# Patient Record
Sex: MALE | Race: WHITE | Employment: OTHER | URBAN - METROPOLITAN AREA
[De-identification: names, ages, dates, MRNs, and addresses within clinical notes are randomized per-mention and may not be internally consistent; named-entity substitution may affect disease eponyms.]

---

## 2022-06-11 ENCOUNTER — APPOINTMENT (OUTPATIENT)
Dept: CT IMAGING | Age: 82
End: 2022-06-11
Attending: EMERGENCY MEDICINE
Payer: MEDICARE

## 2022-06-11 ENCOUNTER — HOSPITAL ENCOUNTER (OUTPATIENT)
Age: 82
Setting detail: OBSERVATION
Discharge: HOME | End: 2022-06-12
Attending: EMERGENCY MEDICINE | Admitting: FAMILY MEDICINE
Payer: MEDICARE

## 2022-06-11 ENCOUNTER — APPOINTMENT (OUTPATIENT)
Dept: MRI IMAGING | Age: 82
End: 2022-06-11
Attending: FAMILY MEDICINE
Payer: MEDICARE

## 2022-06-11 DIAGNOSIS — G45.9 TIA (TRANSIENT ISCHEMIC ATTACK): Primary | ICD-10-CM

## 2022-06-11 LAB
ALBUMIN SERPL-MCNC: 3.6 G/DL (ref 3.5–5)
ALBUMIN/GLOB SERPL: 1.3 {RATIO} (ref 1.1–2.2)
ALP SERPL-CCNC: 85 U/L (ref 45–117)
ALT SERPL-CCNC: 39 U/L (ref 12–78)
ANION GAP SERPL CALC-SCNC: 1 MMOL/L (ref 5–15)
APPEARANCE UR: CLEAR
AST SERPL-CCNC: 28 U/L (ref 15–37)
BACTERIA URNS QL MICRO: NEGATIVE /HPF
BASOPHILS # BLD: 0 K/UL (ref 0–0.1)
BASOPHILS NFR BLD: 0 % (ref 0–1)
BILIRUB SERPL-MCNC: 0.7 MG/DL (ref 0.2–1)
BILIRUB UR QL: NEGATIVE
BUN SERPL-MCNC: 21 MG/DL (ref 6–20)
BUN/CREAT SERPL: 27 (ref 12–20)
CALCIUM SERPL-MCNC: 9 MG/DL (ref 8.5–10.1)
CHLORIDE SERPL-SCNC: 103 MMOL/L (ref 97–108)
CK SERPL-CCNC: 104 U/L (ref 39–308)
CK SERPL-CCNC: 87 U/L (ref 39–308)
CO2 SERPL-SCNC: 33 MMOL/L (ref 21–32)
COLOR UR: ABNORMAL
COVID-19 RAPID TEST, COVR: NOT DETECTED
CREAT SERPL-MCNC: 0.79 MG/DL (ref 0.7–1.3)
DIFFERENTIAL METHOD BLD: NORMAL
EOSINOPHIL # BLD: 0.2 K/UL (ref 0–0.4)
EOSINOPHIL NFR BLD: 3 % (ref 0–7)
EPITH CASTS URNS QL MICRO: ABNORMAL /LPF
ERYTHROCYTE [DISTWIDTH] IN BLOOD BY AUTOMATED COUNT: 12.7 % (ref 11.5–14.5)
GLOBULIN SER CALC-MCNC: 2.7 G/DL (ref 2–4)
GLUCOSE SERPL-MCNC: 96 MG/DL (ref 65–100)
GLUCOSE UR STRIP.AUTO-MCNC: NEGATIVE MG/DL
HCT VFR BLD AUTO: 39.7 % (ref 36.6–50.3)
HGB BLD-MCNC: 13.2 G/DL (ref 12.1–17)
HGB UR QL STRIP: ABNORMAL
HYALINE CASTS URNS QL MICRO: ABNORMAL /LPF (ref 0–5)
IMM GRANULOCYTES # BLD AUTO: 0 K/UL (ref 0–0.04)
IMM GRANULOCYTES NFR BLD AUTO: 0 % (ref 0–0.5)
INR PPP: 1 (ref 0.9–1.1)
KETONES UR QL STRIP.AUTO: NEGATIVE MG/DL
LEUKOCYTE ESTERASE UR QL STRIP.AUTO: NEGATIVE
LYMPHOCYTES # BLD: 1.6 K/UL (ref 0.8–3.5)
LYMPHOCYTES NFR BLD: 24 % (ref 12–49)
MCH RBC QN AUTO: 32 PG (ref 26–34)
MCHC RBC AUTO-ENTMCNC: 33.2 G/DL (ref 30–36.5)
MCV RBC AUTO: 96.4 FL (ref 80–99)
MONOCYTES # BLD: 0.4 K/UL (ref 0–1)
MONOCYTES NFR BLD: 7 % (ref 5–13)
NEUTS SEG # BLD: 4.3 K/UL (ref 1.8–8)
NEUTS SEG NFR BLD: 66 % (ref 32–75)
NITRITE UR QL STRIP.AUTO: NEGATIVE
NRBC # BLD: 0 K/UL (ref 0–0.01)
NRBC BLD-RTO: 0 PER 100 WBC
PH UR STRIP: 7.5 [PH] (ref 5–8)
PLATELET # BLD AUTO: 165 K/UL (ref 150–400)
PMV BLD AUTO: 10.3 FL (ref 8.9–12.9)
POTASSIUM SERPL-SCNC: 4.3 MMOL/L (ref 3.5–5.1)
PROT SERPL-MCNC: 6.3 G/DL (ref 6.4–8.2)
PROT UR STRIP-MCNC: NEGATIVE MG/DL
PROTHROMBIN TIME: 10.9 SEC (ref 9–11.1)
RBC # BLD AUTO: 4.12 M/UL (ref 4.1–5.7)
RBC #/AREA URNS HPF: ABNORMAL /HPF (ref 0–5)
SODIUM SERPL-SCNC: 137 MMOL/L (ref 136–145)
SOURCE, COVRS: NORMAL
SP GR UR REFRACTOMETRY: 1.01 (ref 1–1.03)
TROPONIN-HIGH SENSITIVITY: 10 NG/L (ref 0–76)
TROPONIN-HIGH SENSITIVITY: 11 NG/L (ref 0–76)
UR CULT HOLD, URHOLD: NORMAL
UROBILINOGEN UR QL STRIP.AUTO: 1 EU/DL (ref 0.2–1)
WBC # BLD AUTO: 6.5 K/UL (ref 4.1–11.1)
WBC URNS QL MICRO: ABNORMAL /HPF (ref 0–4)

## 2022-06-11 PROCEDURE — 99285 EMERGENCY DEPT VISIT HI MDM: CPT

## 2022-06-11 PROCEDURE — 80053 COMPREHEN METABOLIC PANEL: CPT

## 2022-06-11 PROCEDURE — 84484 ASSAY OF TROPONIN QUANT: CPT

## 2022-06-11 PROCEDURE — 36415 COLL VENOUS BLD VENIPUNCTURE: CPT

## 2022-06-11 PROCEDURE — 93005 ELECTROCARDIOGRAM TRACING: CPT

## 2022-06-11 PROCEDURE — 85610 PROTHROMBIN TIME: CPT

## 2022-06-11 PROCEDURE — 0042T CT CODE NEURO PERF W CBF: CPT

## 2022-06-11 PROCEDURE — 85025 COMPLETE CBC W/AUTO DIFF WBC: CPT

## 2022-06-11 PROCEDURE — G0378 HOSPITAL OBSERVATION PER HR: HCPCS

## 2022-06-11 PROCEDURE — 82550 ASSAY OF CK (CPK): CPT

## 2022-06-11 PROCEDURE — 70551 MRI BRAIN STEM W/O DYE: CPT

## 2022-06-11 PROCEDURE — 87635 SARS-COV-2 COVID-19 AMP PRB: CPT

## 2022-06-11 PROCEDURE — 74011250637 HC RX REV CODE- 250/637: Performed by: FAMILY MEDICINE

## 2022-06-11 PROCEDURE — 70496 CT ANGIOGRAPHY HEAD: CPT

## 2022-06-11 PROCEDURE — 70450 CT HEAD/BRAIN W/O DYE: CPT

## 2022-06-11 PROCEDURE — 74011000636 HC RX REV CODE- 636: Performed by: RADIOLOGY

## 2022-06-11 PROCEDURE — 81001 URINALYSIS AUTO W/SCOPE: CPT

## 2022-06-11 PROCEDURE — 74011250636 HC RX REV CODE- 250/636: Performed by: FAMILY MEDICINE

## 2022-06-11 RX ORDER — SOTALOL HYDROCHLORIDE 80 MG/1
40 TABLET ORAL EVERY EVENING
COMMUNITY

## 2022-06-11 RX ORDER — ASPIRIN 81 MG/1
81 TABLET ORAL DAILY
COMMUNITY
End: 2022-06-12

## 2022-06-11 RX ORDER — ACETAMINOPHEN 325 MG/1
650 TABLET ORAL
Status: DISCONTINUED | OUTPATIENT
Start: 2022-06-11 | End: 2022-06-12 | Stop reason: HOSPADM

## 2022-06-11 RX ORDER — ACETAMINOPHEN 650 MG/1
650 SUPPOSITORY RECTAL
Status: DISCONTINUED | OUTPATIENT
Start: 2022-06-11 | End: 2022-06-12 | Stop reason: HOSPADM

## 2022-06-11 RX ORDER — ATORVASTATIN CALCIUM 40 MG/1
80 TABLET, FILM COATED ORAL
Status: DISCONTINUED | OUTPATIENT
Start: 2022-06-11 | End: 2022-06-12 | Stop reason: HOSPADM

## 2022-06-11 RX ORDER — SODIUM CHLORIDE 9 MG/ML
75 INJECTION, SOLUTION INTRAVENOUS CONTINUOUS
Status: DISCONTINUED | OUTPATIENT
Start: 2022-06-11 | End: 2022-06-12 | Stop reason: HOSPADM

## 2022-06-11 RX ORDER — GUAIFENESIN 100 MG/5ML
81 LIQUID (ML) ORAL DAILY
Status: DISCONTINUED | OUTPATIENT
Start: 2022-06-12 | End: 2022-06-12

## 2022-06-11 RX ORDER — SOTALOL HYDROCHLORIDE 80 MG/1
80 TABLET ORAL EVERY MORNING
COMMUNITY
Start: 2021-12-13

## 2022-06-11 RX ORDER — FINASTERIDE 5 MG/1
5 TABLET, FILM COATED ORAL EVERY EVENING
COMMUNITY

## 2022-06-11 RX ORDER — ATORVASTATIN CALCIUM 20 MG/1
20 TABLET, FILM COATED ORAL EVERY EVENING
COMMUNITY
Start: 2021-07-08

## 2022-06-11 RX ORDER — FAMOTIDINE 20 MG/1
20 TABLET, FILM COATED ORAL EVERY 12 HOURS
Status: DISCONTINUED | OUTPATIENT
Start: 2022-06-11 | End: 2022-06-12 | Stop reason: HOSPADM

## 2022-06-11 RX ADMIN — IOPAMIDOL 20 ML: 755 INJECTION, SOLUTION INTRAVENOUS at 13:56

## 2022-06-11 RX ADMIN — SODIUM CHLORIDE 75 ML/HR: 9 INJECTION, SOLUTION INTRAVENOUS at 20:51

## 2022-06-11 RX ADMIN — ATORVASTATIN CALCIUM 80 MG: 20 TABLET, FILM COATED ORAL at 20:52

## 2022-06-11 RX ADMIN — IOPAMIDOL 100 ML: 755 INJECTION, SOLUTION INTRAVENOUS at 13:57

## 2022-06-11 NOTE — ED NOTES
Patient LKW 12 PM. Patient C/O slurred speech and AMS for about an hour, which has now resolved. Pt A&O x4, hx of A fib. BG per ems 163, HR 50s, and /90.

## 2022-06-11 NOTE — ED PROVIDER NOTES
81-year-old male with history of atrial fibrillation, on Eliquis, presents to the emergency department by EMS after he was last known well at about noon cleaning his car when he developed confusion and slurred speech that was witnessed by his family. The symptoms reportedly lasted for about an hour and EMS called. Upon their arrival they found him to be improving and on arrival to the emergency department he denies any further symptoms. His blood pressure was significantly elevated on scene at 201/90. Blood glucose was 163, heart rate in the 50s other was awake alert and oriented x4. No history of prior CVAs. No head trauma. No fever, chills, chest pain, shortness of breath, dysuria or any other medical concerns. He says that he has been compliant with his medications. Past Medical History:   Diagnosis Date    Atrial fibrillation ([facility] [address]        No past surgical history on file. No family history on file. Social History     Socioeconomic History    Marital status:      Spouse name: Not on file    Number of children: Not on file    Years of education: Not on file    Highest education level: Not on file   Occupational History    Not on file   Tobacco Use    Smoking status: Not on file    Smokeless tobacco: Not on file   Substance and Sexual Activity    Alcohol use: Not on file    Drug use: Not on file    Sexual activity: Not on file   Other Topics Concern    Not on file   Social History Narrative    Not on file     Social Determinants of Health     Financial Resource Strain:     Difficulty of Paying Living Expenses: Not on file   Food Insecurity:     Worried About Running Out of Food in the Last Year: Not on file    Dane of Food in the Last Year: Not on file   Transportation Needs:     Lack of Transportation (Medical): Not on file    Lack of Transportation (Non-Medical):  Not on file   Physical Activity:     Days of Exercise per Week: Not on file    Minutes of Exercise per Session: Not on file   Stress:     Feeling of Stress : Not on file   Social Connections:     Frequency of Communication with Friends and Family: Not on file    Frequency of Social Gatherings with Friends and Family: Not on file    Attends Voodoo Services: Not on file    Active Member of Clubs or Organizations: Not on file    Attends Club or Organization Meetings: Not on file    Marital Status: Not on file   Intimate Partner Violence:     Fear of Current or Ex-Partner: Not on file    Emotionally Abused: Not on file    Physically Abused: Not on file    Sexually Abused: Not on file   Housing Stability:     Unable to Pay for Housing in the Last Year: Not on file    Number of [place] in the Last Year: Not on file    Unstable Housing in the Last Year: Not on file         ALLERGIES: Patient has no allergy information on record. Review of Systems   Constitutional: Positive for activity change. Negative for appetite change, chills and fever. HENT: Negative for congestion, rhinorrhea, sinus pain, sneezing and sore throat. Eyes: Negative for photophobia and visual disturbance. Respiratory: Negative for cough and shortness of breath. Cardiovascular: Negative for chest pain. Gastrointestinal: Negative for abdominal pain, blood in stool, constipation, diarrhea, nausea and vomiting. Genitourinary: Negative for difficulty urinating, dysuria, flank pain, hematuria, penile pain and testicular pain. Musculoskeletal: Negative for arthralgias, back pain, myalgias and neck pain. Skin: Negative for rash and wound. Neurological: Positive for speech difficulty. Negative for dizziness, syncope, facial asymmetry, weakness, light-headedness, numbness and headaches. Psychiatric/Behavioral: Positive for confusion. Negative for self-injury and suicidal ideas. All other systems reviewed and are negative.       Vitals:    06/11/22 1501 06/11/22 1506 06/11/22 1530 06/11/22 1531   BP: (!) 171/104  (!) 175/104    Pulse: (!) 58 (!) 57 62    Resp: 17 26  15   SpO2: 96% 95% 96%    Weight:                Physical Exam  Vitals and nursing note reviewed. Constitutional:       General: He is not in acute distress. Appearance: Normal appearance. He is well-developed. He is not diaphoretic. Comments: Pleasant gentleman, no acute distress. HENT:      Head: Normocephalic and atraumatic. Nose: Nose normal.   Eyes:      Extraocular Movements: Extraocular movements intact. Conjunctiva/sclera: Conjunctivae normal.      Pupils: Pupils are equal, round, and reactive to light. Cardiovascular:      Rate and Rhythm: Normal rate and regular rhythm. Heart sounds: Normal heart sounds. Pulmonary:      Effort: Pulmonary effort is normal.      Breath sounds: Normal breath sounds. Abdominal:      General: There is no distension. Palpations: Abdomen is soft. Tenderness: There is no abdominal tenderness. Musculoskeletal:         General: No tenderness. Cervical back: Normal range of motion and neck supple. Skin:     General: Skin is warm and dry. Neurological:      General: No focal deficit present. Mental Status: He is alert and oriented to person, place, and time. Cranial Nerves: No cranial nerve deficit. Sensory: No sensory deficit. Motor: No weakness. Coordination: Coordination normal.      Comments: Intact sensation, 5/5 strength in all 4 extremities, intact finger to nose, neg pronator drift, fluent speech, CN intact. MDM   81-year-old male presents with suspected TIA. Now neuro intact. CT, CTA/CTP ordered to further evaluate. Case was discussed with teleneurology who saw the patient at the bedside. They recommend admission for TIA work-up. Labs returned showing no significant abnormalities. CT scans show no acute abnormalities.     Will admit to the hospitalist service for TIA work-up  Procedures  1425 EKG shows sinus bradycardia with a rate of 58 bpm with no acute ST or T wave abnormalities suggestive of ischemia. Perfect Serve Consult for Admission  2:49 PM    ED Room Number: ER06/06  Patient Name and age:  Gracy Dumont 81 y.o.  male  Working Diagnosis:   1. TIA (transient ischemic attack)        COVID-19 Suspicion:  no  Sepsis present:  no  Reassessment needed: no  Code Status:  Full Code  Readmission: no  Isolation Requirements:  no  Recommended Level of Care:  telemetry  Department:[facility] Adult ED - 21   Other: 81-year-old male presented with acute confusion and slurred speech for approximately 1 hour. Last seen normal around noon today. Symptoms now resolved. CTs negative. Hx of a. fib on Eliquis. Seen by teleneuro recommended admit for TIA work-up. No hx of prior CVA.

## 2022-06-11 NOTE — ADVANCED PRACTICE NURSE
Neurocritical Care Code Stroke Documentation      Symptoms:   slurred speech x 1 hour with AMS, resolved on arrival, high BP in pt with afib on Eliquis, took this morning. He also is having urinary frequency with having to urinate 3x within 27 mine   Last Known Well: 12 noon today    Medical hx: Past Medical History:   Diagnosis Date    Atrial fibrillation (HCC)       Anticoagulation:  Eliquis, took this morning   VAN:   Negative   NIHSS:   1a-LOC:0    1b-Month/Age:0    1c-Open/Close Hand:0    2-Best Gaze:0    3-Visual Fields:0    4-Facial Palsy:0    5a-Left Arm:0    5b-Right Arm:0    6a-Left Le    6b-Right Le    7-Limb Ataxia:0    8-Sensory:0    9-Best Language:0    10-Dysarthria:0    11-Extinction/Inattention:0  TOTAL SCORE:0   Imaging:   CT: no acute abnormality    CTA: No LVO    CTP: No perfusion mismatch   Plan:   TPA Candidate: NO--NIIH 0, resolved S/S    Mechanical thrombectomy Candidate: NO--no LVO     *Perform dysphagia screening prior to any PO intake*    Discussed with: Dr. Tory Hutchinson, Dr. Laxmi Espino, ED RN, patient     Arrival time: in ED when pt arrived  Time spent: 40 minutes.      Ladi Baca NP  Neurocritical Care Nurse Practitioner  [phone]

## 2022-06-11 NOTE — H&P
[facility] Adult  Hospitalist Group  History and Physical    Date of Service:  6/11/2022  Primary Care Provider: Varun, MD Thomas  Source of information: The patient and Chart review    Chief Complaint: No chief complaint on file. History of Presenting Illness:   Rosa Arias is a 81 y.o. male who presents with slurred speech    History was probably obtained from the patient, patient reports that today he had his granddaughter's graduation and was talking to family members when he became confused and had slurred speech, the symptoms lasted for about an hour, he got concerned and decided to come to the hospital, patient reports currently his symptom-free, patient reports that he had no weakness in his limbs, patient on arrival to the ED had a blood pressure of 201/ 90, patient was requested to be admitted to the hospitalist service    The patient denies any headache, blurry vision, sore throat, trouble swallowing, trouble with speech, chest pain, SOB, cough, fever, chills, N/V/D, abd pain, urinary symptoms, constipation, recent travels, sick contacts, falls, injuries, rashes, contact with COVID-19 diagnosed patients, hematemesis, melena, hemoptysis, hematuria, rashes, denies starting any new medications and denies any other concerns or problems besides as mentioned above. REVIEW OF SYSTEMS:  A comprehensive review of systems was negative except for that written in the History of Present Illness. Past Medical History:   Diagnosis Date    Atrial fibrillation ([address]       No past surgical history on file. Prior to Admission medications    Not on File     Not on File   No family history on file. Social History:       Family and social history were personally reviewed, all pertinent and relevant details are outlined as above.     Objective:     Visit Vitals  BP (!) 147/66   Pulse (!) 52   Resp 11   Wt 78.9 kg (174 lb)   SpO2 92%           PHYSICAL EXAM:   General: Alert x oriented x 3, awake,   HEENT: PEERL, EOMI, moist mucus membranes  Neck: Supple, no JVD, no meningeal signs  Chest: Clear to auscultation bilaterally   CVS: RRR, S1 S2 heard, no murmurs/rubs/gallops  Abd: Soft, non-tender, non-distended, +bowel sounds   Ext: No clubbing, no cyanosis, no edema  Neuro/Psych: Pleasant mood and affect, CN 2-12 grossly intact, sensory grossly within normal limit, Strength 5/5 in all extremities, DTR 1+ x 4  Cap refill: Brisk, less than 3 seconds  Pulses: 2+, symmetric in all extremities  Skin: Warm, dry, without rashes or lesions    Data Review: All diagnostic labs and studies have been reviewed. Abnormal Labs Reviewed   METABOLIC PANEL, COMPREHENSIVE - Abnormal; Notable for the following components:       Result Value    CO2 33 (*)     Anion gap 1 (*)     BUN 21 (*)     BUN/Creatinine ratio 27 (*)     Protein, total 6.3 (*)     All other components within normal limits       All Micro Results     Procedure Component Value Units Date/Time    URINE CULTURE HOLD SAMPLE [[id number]]     Order Status: Sent Specimen: Urine           IMAGING:   CTA CODE NEURO HEAD AND NECK W CONT   Final Result   1. Mild left vertebral artery origin stenosis. 2.  Mild right vertebral artery stenosis in C4 transverse foramen. 3.  No evidence of acute ischemia or tissue at ischemic risk. 4.  Partially imaged superior left lower lobe groundglass airspace disease. Findings may be infectious or inflammatory in origin. CT CODE NEURO PERF W CBF   Final Result   1. Mild left vertebral artery origin stenosis. 2.  Mild right vertebral artery stenosis in C4 transverse foramen. 3.  No evidence of acute ischemia or tissue at ischemic risk. 4.  Partially imaged superior left lower lobe groundglass airspace disease. Findings may be infectious or inflammatory in origin. CT CODE NEURO HEAD WO CONTRAST   Final Result   No acute intracranial abnormality. Mild age-related change.             MRI BRAIN WO CONT    (Results Pending)        ECG/ECHO:    Results for orders placed or performed during the hospital encounter of 06/11/22   EKG, 12 LEAD, INITIAL   Result Value Ref Range    Ventricular Rate 58 BPM    Atrial Rate 58 BPM    P-R Interval 208 ms    QRS Duration 98 ms    Q-T Interval 436 ms    QTC Calculation (Bezet) 428 ms    Calculated P Axis 71 degrees    Calculated R Axis 5 degrees    Calculated T Axis 29 degrees    Diagnosis       Sinus bradycardia  Otherwise normal ECG  No previous ECGs available          Assessment:   Given the patient's current clinical presentation, there is a high level of concern for decompensation if discharged from the emergency department. Complex decision making was performed, which includes reviewing the patient's available past medical records, laboratory results, and imaging studies. Transient ischemic attack  Atrial fibrillation  Accelerated hypertension  Dehydration  Plan:   Patient will be admitted on a telemetry bed, rule out CVA, MRI of the brain, neurovascular checks, aspirin, statin, neurology consult, neurovascular checks, any changes in neurological status will mandate emergent intervention, PT OT speech consult, hemoglobin A1c and troponin, monitor and reassess as needed  Currently on Eliquis, does not remember the dose of Eliquis, will resume and continue to monitor  Blood pressure improved, continue to monitor, once home dosage of medications available we will continue the same  Gentle IV hydration      DIET: ADULT DIET Regular; 4 carb choices (60 gm/meal); Low Fat/Low Chol/High Fiber/2 gm Na; Low Sodium (2 gm)   ISOLATION PRECAUTIONS: There are currently no Active Isolations  CODE STATUS: Full Code   DVT PROPHYLAXIS: SCDs  FUNCTIONAL STATUS PRIOR TO HOSPITALIZATION: Fully active and ambulatory; able to carry on all self-care without restriction.   EARLY MOBILITY ASSESSMENT: Recommend an assessment from physical therapy and/or occupational therapy  ANTICIPATED DISCHARGE: 24-48 hours. Signed By: Shahana Grier MD     June 11, 2022         Please note that this dictation may have been completed with Dragon, the computer voice recognition software. Quite often unanticipated grammatical, syntax, homophones, and other interpretive errors are inadvertently transcribed by the computer software. Please disregard these errors. Please excuse any errors that have escaped final proofreading.

## 2022-06-12 VITALS
HEART RATE: 61 BPM | RESPIRATION RATE: 15 BRPM | OXYGEN SATURATION: 98 % | WEIGHT: 174 LBS | SYSTOLIC BLOOD PRESSURE: 134 MMHG | TEMPERATURE: 97.6 F | DIASTOLIC BLOOD PRESSURE: 72 MMHG

## 2022-06-12 LAB
ANION GAP SERPL CALC-SCNC: 4 MMOL/L (ref 5–15)
ATRIAL RATE: 58 BPM
BUN SERPL-MCNC: 11 MG/DL (ref 6–20)
BUN/CREAT SERPL: 17 (ref 12–20)
CALCIUM SERPL-MCNC: 9 MG/DL (ref 8.5–10.1)
CALCULATED P AXIS, ECG09: 71 DEGREES
CALCULATED R AXIS, ECG10: 5 DEGREES
CALCULATED T AXIS, ECG11: 29 DEGREES
CHLORIDE SERPL-SCNC: 107 MMOL/L (ref 97–108)
CHOLEST SERPL-MCNC: 122 MG/DL
CO2 SERPL-SCNC: 28 MMOL/L (ref 21–32)
CREAT SERPL-MCNC: 0.66 MG/DL (ref 0.7–1.3)
DIAGNOSIS, 93000: NORMAL
ERYTHROCYTE [DISTWIDTH] IN BLOOD BY AUTOMATED COUNT: 12.9 % (ref 11.5–14.5)
EST. AVERAGE GLUCOSE BLD GHB EST-MCNC: 120 MG/DL
GLUCOSE SERPL-MCNC: 97 MG/DL (ref 65–100)
HBA1C MFR BLD: 5.8 % (ref 4–5.6)
HCT VFR BLD AUTO: 43.4 % (ref 36.6–50.3)
HDLC SERPL-MCNC: 68 MG/DL
HDLC SERPL: 1.8 {RATIO} (ref 0–5)
HGB BLD-MCNC: 14.6 G/DL (ref 12.1–17)
LDLC SERPL CALC-MCNC: 42.4 MG/DL (ref 0–100)
MAGNESIUM SERPL-MCNC: 2.3 MG/DL (ref 1.6–2.4)
MCH RBC QN AUTO: 32.8 PG (ref 26–34)
MCHC RBC AUTO-ENTMCNC: 33.6 G/DL (ref 30–36.5)
MCV RBC AUTO: 97.5 FL (ref 80–99)
NRBC # BLD: 0 K/UL (ref 0–0.01)
NRBC BLD-RTO: 0 PER 100 WBC
P-R INTERVAL, ECG05: 208 MS
PHOSPHATE SERPL-MCNC: 2.9 MG/DL (ref 2.6–4.7)
PLATELET # BLD AUTO: 163 K/UL (ref 150–400)
PMV BLD AUTO: 10.5 FL (ref 8.9–12.9)
POTASSIUM SERPL-SCNC: 3.7 MMOL/L (ref 3.5–5.1)
Q-T INTERVAL, ECG07: 436 MS
QRS DURATION, ECG06: 98 MS
QTC CALCULATION (BEZET), ECG08: 428 MS
RBC # BLD AUTO: 4.45 M/UL (ref 4.1–5.7)
SODIUM SERPL-SCNC: 139 MMOL/L (ref 136–145)
TRIGL SERPL-MCNC: 58 MG/DL (ref ?–150)
TSH SERPL DL<=0.05 MIU/L-ACNC: 3.09 UIU/ML (ref 0.36–3.74)
VENTRICULAR RATE, ECG03: 58 BPM
VLDLC SERPL CALC-MCNC: 11.6 MG/DL
WBC # BLD AUTO: 6.7 K/UL (ref 4.1–11.1)

## 2022-06-12 PROCEDURE — 74011250637 HC RX REV CODE- 250/637: Performed by: FAMILY MEDICINE

## 2022-06-12 PROCEDURE — 74011250637 HC RX REV CODE- 250/637: Performed by: PSYCHIATRY & NEUROLOGY

## 2022-06-12 PROCEDURE — 83036 HEMOGLOBIN GLYCOSYLATED A1C: CPT

## 2022-06-12 PROCEDURE — 80048 BASIC METABOLIC PNL TOTAL CA: CPT

## 2022-06-12 PROCEDURE — 97161 PT EVAL LOW COMPLEX 20 MIN: CPT

## 2022-06-12 PROCEDURE — 83735 ASSAY OF MAGNESIUM: CPT

## 2022-06-12 PROCEDURE — 94760 N-INVAS EAR/PLS OXIMETRY 1: CPT

## 2022-06-12 PROCEDURE — 97116 GAIT TRAINING THERAPY: CPT

## 2022-06-12 PROCEDURE — 84100 ASSAY OF PHOSPHORUS: CPT

## 2022-06-12 PROCEDURE — G0378 HOSPITAL OBSERVATION PER HR: HCPCS

## 2022-06-12 PROCEDURE — 85027 COMPLETE CBC AUTOMATED: CPT

## 2022-06-12 PROCEDURE — 36415 COLL VENOUS BLD VENIPUNCTURE: CPT

## 2022-06-12 PROCEDURE — 99205 OFFICE O/P NEW HI 60 MIN: CPT | Performed by: PSYCHIATRY & NEUROLOGY

## 2022-06-12 PROCEDURE — 80061 LIPID PANEL: CPT

## 2022-06-12 PROCEDURE — 74011250636 HC RX REV CODE- 250/636: Performed by: FAMILY MEDICINE

## 2022-06-12 PROCEDURE — 84443 ASSAY THYROID STIM HORMONE: CPT

## 2022-06-12 RX ORDER — SOTALOL HYDROCHLORIDE 80 MG/1
80 TABLET ORAL EVERY MORNING
Status: DISCONTINUED | OUTPATIENT
Start: 2022-06-12 | End: 2022-06-12 | Stop reason: HOSPADM

## 2022-06-12 RX ORDER — CLOPIDOGREL BISULFATE 75 MG/1
75 TABLET ORAL DAILY
Qty: 30 TABLET | Refills: 0 | Status: SHIPPED | OUTPATIENT
Start: 2022-06-12

## 2022-06-12 RX ORDER — FINASTERIDE 5 MG/1
5 TABLET, FILM COATED ORAL EVERY EVENING
Status: DISCONTINUED | OUTPATIENT
Start: 2022-06-12 | End: 2022-06-12 | Stop reason: HOSPADM

## 2022-06-12 RX ORDER — CLOPIDOGREL BISULFATE 75 MG/1
75 TABLET ORAL DAILY
Status: DISCONTINUED | OUTPATIENT
Start: 2022-06-12 | End: 2022-06-12 | Stop reason: HOSPADM

## 2022-06-12 RX ORDER — SOTALOL HYDROCHLORIDE 80 MG/1
40 TABLET ORAL EVERY EVENING
Status: DISCONTINUED | OUTPATIENT
Start: 2022-06-12 | End: 2022-06-12 | Stop reason: HOSPADM

## 2022-06-12 RX ADMIN — ASPIRIN 81 MG CHEWABLE TABLET 81 MG: 81 TABLET CHEWABLE at 09:03

## 2022-06-12 RX ADMIN — FAMOTIDINE 20 MG: 20 TABLET ORAL at 09:03

## 2022-06-12 RX ADMIN — SOTALOL HYDROCHLORIDE 80 MG: 80 TABLET ORAL at 09:39

## 2022-06-12 RX ADMIN — SODIUM CHLORIDE 75 ML/HR: 9 INJECTION, SOLUTION INTRAVENOUS at 00:38

## 2022-06-12 RX ADMIN — APIXABAN 5 MG: 5 TABLET, FILM COATED ORAL at 09:39

## 2022-06-12 RX ADMIN — CLOPIDOGREL BISULFATE 75 MG: 75 TABLET ORAL at 12:43

## 2022-06-12 NOTE — ED NOTES
TRANSFER - OUT REPORT:    Verbal report given to Natalia DUFFY(name) on Vic [place]o  being transferred to [facility](6S) for routine progression of care       Report consisted of patients Situation, Background, Assessment and   Recommendations(SBAR). Information from the following report(s) SBAR, ED Summary and MAR was reviewed with the receiving nurse. Lines:   Peripheral IV 06/11/22 Anterior;Distal;Left Forearm (Active)       Peripheral IV 06/11/22 Left Antecubital (Active)        Opportunity for questions and clarification was provided.       Patient transported with:   Registered Nurse

## 2022-06-12 NOTE — CONSULTS
NEUROLOGY CONSULT NOTE    Name Rosa Arias Age 81 y.o. MRN [id number]  1940     Consulting Physician: Lucía Cohn MD      Chief Complaint:  Aphasia     Assessment:     · Active Problems:  ·   TIA (transient ischemic attack) (2022)  ·   ·   81year old male with a h/o HTN, HPL, TIA, Afib on Eliquis/ASA admitted due to acute transient dysarthria and expressive aphasia lasting approximately 1 hour 22. Symptoms are presently resolved with non-focal examination. No evidence of significant vascular stenosis or LVO on CTA. MRI Brain did not reveal any acute ischemia. Suspect L MCA TIA. Recommendations:   Continue Eliquis 5mg BID for stroke prevention  D/C ASA, start Plavix 75mg daily  Continue home statin therapy, LDL is at goal <70  SBP goal<140  Will discontinue TTE as this will not change current management  No anticipated skilled needs  Ok to discharge today with Neurology/Cardiology outpatient f/u locally ([place])    Thank you very much for this referral. I appreciate the opportunity to participate in this patient's care. History of Present Illness: This is a 81 y.o.   male, we were asked to see for aphasia. PMH notable for Afib maintained on Eliquis/ASA, HPL, HTN, TIA. He is visiting from [place] to attend his granddaughter's graduation. He was at his daughter's home yesterday when he suddenly experienced difficulty getting words out and slight dysarthria. No accompanying focal weakness, numbness, vision deficits. Symptoms persisted for approximately 1 hour. Head CT/CTA on admission did not reveal any acute intracranial pathology or LVO/significant stenosis. MRI Brain was also reviewed without acute ischemia. Blood pressure was severely elevated on arrival 201/90. He reports compliance with Eliquis/ASA PTA. No recurrent episodes since arrival, although he does recall prior similar presentation 1 year ago lasting 10-15 minutes.  He did not seek medical attention at that time but mentioned this to his Cardiologist who was concerned regarding possible TIA. ASA was subsequently added to his medications. No Known Allergies     Prior to Admission medications    Medication Sig Start Date End Date Taking? Authorizing Provider   apixaban (Eliquis) 5 mg tablet Take 5 mg by mouth two (2) times a day. 4/21/22  Yes Other, [place] [facility]   sotaloL (BETAPACE) 80 mg tablet Take 80 mg by mouth Every morning. TAKE 80MG BY MOUTH EVERY MORNING AND 40MG BY MOUTH EVERY EVENING 12/13/21  Yes Other, [place] [facility]   atorvastatin (LIPITOR) 20 mg tablet Take 20 mg by mouth every evening. 7/8/21  Yes Other, [place] [facility]   finasteride (PROSCAR) 5 mg tablet Take 5 mg by mouth every evening. Yes Other, [place] [facility]   aspirin delayed-release 81 mg tablet Take 81 mg by mouth daily. Yes Other, [place] [facility]   sotaloL (BETAPACE) 80 mg tablet Take 40 mg by mouth every evening. TAKE 80MG BY MOUTH EVERY MORNING AND 40MG BY MOUTH EVERY EVENING   Yes Provider, Historical       Past Medical History:   Diagnosis Date    Atrial fibrillation ([facility] [address]         Social History     Tobacco Use    Smoking status: Not on file    Smokeless tobacco: Not on file   Substance Use Topics    Alcohol use: Not on file      PSH/FHX reviewed and non-contributory    Review of Systems:   Comprehensive review of systems performed and negative except for as listed above. Exam:     Visit Vitals  /72 (BP 1 Location: Left upper arm, BP Patient Position: At rest)   Pulse 66   Temp 97.6 °F (36.4 °C)   Resp 15   Wt 174 lb (78.9 kg)   SpO2 98%        General: Well developed, well nourished. Patient in no apparent distress   Head: Normocephalic, atraumatic, anicteric sclera   Lungs:  Clear to auscultation bilaterally, No wheezes or rubs   Cardiac: Regular rate and rhythm with no murmurs. Abd: Bowel sounds were audible.  No tenderness on palpation   Ext: No pedal edema   Skin: No overt signs of rash     Neurological Exam:  Mental Status: Alert and oriented to person place and time   Speech: Fluent no aphasia or dysarthria. Cranial Nerves:   Intact visual fields. Facial sensation is normal. Facial movement is symmetric. Palate is midline. Normal sternocleidomastoid strength. Tongue is midline. Hearing is intact bilaterally. Eyes: PERRL, EOM's full, no nystagmus, no ptosis. Motor:  Full and symmetric strength of upper and lower proximal and distal muscles. Normal bulk and tone. Reflexes:   Deep tendon reflexes 1+/4 and symmetrical.  Plantar response is downgoing b/l. Sensory:   Symmetrically intact  with no perceived deficits modalities involving small or large fibers. Gait:  Gait is balanced and fluid with normal arm swing. Tremor:   No tremor noted. Cerebellar:  Finger to nose and heel over shin to knee was demonstrated competently. Neurovascular: No carotid bruits. Imaging  CT Results (maximum last 3): Results from [place] encounter on 06/11/22    CT CODE NEURO PERF W CBF    Narrative  *PRELIMINARY REPORT*    Exam: CTA head and neck. INDICATION: Code stroke. Preliminary findings:    CT PERFUSION: No mismatch. CTA: Dominant right vertebral artery. Heavy calcific plaque at the origins of  both internal carotid arteries. No large vessel occlusion, dissection, aneurysm  or flow-limiting stenosis obvious. Dural venous sinuses are patent. Preliminary report was provided by Dr. Kendall Husain, the on-call radiologist, at  [phone] hours    Final report to follow. *END PRELIMINARY REPORT*    EXAM:  CTA CODE NEURO HEAD AND NECK W CONT, CT CODE NEURO PERF W CBF    INDICATION:   Code Stroke    COMPARISON:  None. CONTRAST: 100 mL of Isovue-370    TECHNIQUE: Unenhanced  images were obtained to localize the volume for  acquisition. Multislice helical axial CT angiography was performed from the  aortic arch to the top of the head during uneventful rapid bolus intravenous  contrast administration.  Coronal and sagittal reformations and 3D post  processing was performed. CT dose reduction was achieved through use of a  standardized protocol tailored for this examination and automatic exposure  control for dose modulation. CT brain perfusion was performed with generation of hemodynamic maps of multiple  parameters, including cerebral blood flow, cerebral blood volume, and MTT (mean  transit time). CT dose reduction was achieved through use of a standardized  protocol tailored for this examination and automatic exposure control for dose  modulation. This study was analyzed by the [address]  algorithm. FINDINGS:    CT Head with Contrast:  No abnormal enhancement. CTA Head:  There is no evidence of large vessel occlusion or flow-limiting stenosis of the  intracranial internal carotid, anterior cerebral, and middle cerebral arteries. The anterior communicating artery is patent. There is no evidence of large vessel occlusion or flow-limiting stenosis of the  intracranial vertebral arteries, basilar artery, or posterior cerebral arteries. The posterior communicating arteries are nonvisualized. There is no evidence of aneurysm or vascular malformation. The dural venous  sinuses and deep cerebral venous system are patent. CTA NECK:  NASCET method was utilized for calculating stenosis. The aortic arch is unremarkable. The common carotid arteries demonstrate no  significant stenosis. There is no evidence of significant stenosis in the  cervical right internal carotid artery. There is no evidence of significant  stenosis in the cervical left internal carotid artery. There is a right dominant vertebrobasilar arterial system. Mild stenosis of the  left vertebral artery origin. Mild right vertebral artery stenosis in the C4  transverse foramen due to exuberant facet arthropathy. Visualized soft tissues of the neck are unremarkable. Partially imaged  groundglass airspace disease in the superior left lower lobe. Multilevel  degenerative changes of the cervical spine. No suspicious osseous lesions. CT Brain Perfusion:  There are no regional areas of elevated Tmax, decreased cerebral blood flow or  blood volume. rCBF < 30% = 0 cc. Tmax > 6 seconds = 0 cc. Impression  1. Mild left vertebral artery origin stenosis. 2.  Mild right vertebral artery stenosis in C4 transverse foramen. 3.  No evidence of acute ischemia or tissue at ischemic risk. 4.  Partially imaged superior left lower lobe groundglass airspace disease. Findings may be infectious or inflammatory in origin. CTA CODE NEURO HEAD AND NECK W CONT    Narrative  *PRELIMINARY REPORT*    Exam: CTA head and neck. INDICATION: Code stroke. Preliminary findings:    CT PERFUSION: No mismatch. CTA: Dominant right vertebral artery. Heavy calcific plaque at the origins of  both internal carotid arteries. No large vessel occlusion, dissection, aneurysm  or flow-limiting stenosis obvious. Dural venous sinuses are patent. Preliminary report was provided by Dr. Pepe Bermeo, the on-call radiologist, at  [phone] hours    Final report to follow. *END PRELIMINARY REPORT*    EXAM:  CTA CODE NEURO HEAD AND NECK W CONT, CT CODE NEURO PERF W CBF    INDICATION:   Code Stroke    COMPARISON:  None. CONTRAST: 100 mL of Isovue-370    TECHNIQUE: Unenhanced  images were obtained to localize the volume for  acquisition. Multislice helical axial CT angiography was performed from the  aortic arch to the top of the head during uneventful rapid bolus intravenous  contrast administration. Coronal and sagittal reformations and 3D post  processing was performed. CT dose reduction was achieved through use of a  standardized protocol tailored for this examination and automatic exposure  control for dose modulation.     CT brain perfusion was performed with generation of hemodynamic maps of multiple  parameters, including cerebral blood flow, cerebral blood volume, and MTT (mean  transit time). CT dose reduction was achieved through use of a standardized  protocol tailored for this examination and automatic exposure control for dose  modulation. This study was analyzed by the [address]  algorithm. FINDINGS:    CT Head with Contrast:  No abnormal enhancement. CTA Head:  There is no evidence of large vessel occlusion or flow-limiting stenosis of the  intracranial internal carotid, anterior cerebral, and middle cerebral arteries. The anterior communicating artery is patent. There is no evidence of large vessel occlusion or flow-limiting stenosis of the  intracranial vertebral arteries, basilar artery, or posterior cerebral arteries. The posterior communicating arteries are nonvisualized. There is no evidence of aneurysm or vascular malformation. The dural venous  sinuses and deep cerebral venous system are patent. CTA NECK:  NASCET method was utilized for calculating stenosis. The aortic arch is unremarkable. The common carotid arteries demonstrate no  significant stenosis. There is no evidence of significant stenosis in the  cervical right internal carotid artery. There is no evidence of significant  stenosis in the cervical left internal carotid artery. There is a right dominant vertebrobasilar arterial system. Mild stenosis of the  left vertebral artery origin. Mild right vertebral artery stenosis in the C4  transverse foramen due to exuberant facet arthropathy. Visualized soft tissues of the neck are unremarkable. Partially imaged  groundglass airspace disease in the superior left lower lobe. Multilevel  degenerative changes of the cervical spine. No suspicious osseous lesions. CT Brain Perfusion:  There are no regional areas of elevated Tmax, decreased cerebral blood flow or  blood volume. rCBF < 30% = 0 cc. Tmax > 6 seconds = 0 cc. Impression  1. Mild left vertebral artery origin stenosis.   2.  Mild right vertebral artery stenosis in C4 transverse foramen. 3.  No evidence of acute ischemia or tissue at ischemic risk. 4.  Partially imaged superior left lower lobe groundglass airspace disease. Findings may be infectious or inflammatory in origin. CT CODE NEURO HEAD WO CONTRAST    Narrative  EXAM: CT CODE NEURO HEAD WO CONTRAST    INDICATION: Code Stroke    COMPARISON: None. CONTRAST: None. TECHNIQUE: Unenhanced CT of the head was performed using 5 mm images. Brain and  bone windows were generated. Coronal and sagittal reformats. CT dose reduction  was achieved through use of a standardized protocol tailored for this  examination and automatic exposure control for dose modulation. FINDINGS:  Generalized prominence of cerebral sulci and ventricles is mildly increased. .  Periventricular white matter low-density is mild and diffuse. . There is no  intracranial hemorrhage, extra-axial collection, or mass effect. The basilar  cisterns are open. No CT evidence of acute infarct. The phil is difficult to  evaluate due to beam hardening artifact. The bone windows demonstrate no abnormalities. The visualized portions of the  paranasal sinuses and mastoid air cells are clear. Impression  No acute intracranial abnormality. Mild age-related change. MRI Results (maximum last 3): Results from [place] encounter on 06/11/22    MRI BRAIN WO CONT    Narrative  EXAM: MRI BRAIN WO CONT    INDICATION: cva    COMPARISON: None. CONTRAST: None. TECHNIQUE:  Multiplanar multisequence acquisition without contrast of the brain. FINDINGS:  Diffusion imaging does not show acute ischemic changes. No extra-axial fluid collection, hemorrhage or shift. Minimal atrophy and white matter disease. Flow voids in major vessels at the base of the brain are present. No mass. Impression  1. No acute findings no mass. 2. Minimal atrophy and nonspecific white matter changes her.       Lab Review  Lab Results   Component Value Date/Time    WBC 6.7 06/12/2022 03:04 AM    HCT 43.4 06/12/2022 03:04 AM    HGB 14.6 06/12/2022 03:04 AM    PLATELET 885 80/40/1050 03:04 AM     Lab Results   Component Value Date/Time    Sodium 139 06/12/2022 03:04 AM    Potassium 3.7 06/12/2022 03:04 AM    Chloride 107 06/12/2022 03:04 AM    CO2 28 06/12/2022 03:04 AM    Glucose 97 06/12/2022 03:04 AM    BUN 11 06/12/2022 03:04 AM    Creatinine 0.66 (L) 06/12/2022 03:04 AM    Calcium 9.0 06/12/2022 03:04 AM     No components found for: TROPQUANT  No results found for: ANGEL    Signed:  Tre Hickman.  Tavares Castrejon DO  6/12/2022  11:57 AM

## 2022-06-12 NOTE — DISCHARGE SUMMARY
Discharge Summary       PATIENT ID: Yasemin Og  MRN: [id number]   YOB: 1940    DATE OF ADMISSION: 6/11/2022  1:55 PM    DATE OF DISCHARGE: 6/12/2022   PRIMARY CARE PROVIDER: Thomas Bond MD     ATTENDING PHYSICIAN: Areli Millan  DISCHARGING PROVIDER: Aicha Troy MD    To contact this individual call [phone] and ask the  to page. If unavailable ask to be transferred the Adult Hospitalist Department. CONSULTATIONS: IP CONSULT TO HOSPITALIST  IP CONSULT TO NEUROLOGY    PROCEDURES/SURGERIES: * No surgery found *    DISCHARGE DIAGNOSES:     TIA  Atrial fibrillation  Accelerated hypertension  Prerenal azotemia    ADMISSION SUMMARY AND HOSPITAL COURSE:     ARI Og is a 81 y.o. male who presents with slurred speech. History was probably obtained from the patient, patient reports that today he had his granddaughter's graduation and was talking to family members when he became confused and had slurred speech, the symptoms lasted for about an hour, he got concerned and decided to come to the hospital, patient reports currently his symptom-free, patient reports that he had no weakness in his limbs, patient on arrival to the ED had a blood pressure of 201/ 90, patient was requested to be admitted to the hospitalist service. Hospital Course  Patient was admitted for further evaluation and management of his slurred speech. Patient slurred speech has resolved at the time of presentation to the hospital.  Initial CT head and CT perfusion under \"neuro protocol shows no acute pathology. CTA of the head and neck shows mild left vertebral artery origin stenosis and mild right vertebral artery stenosis and C4 transverse marcum. MRI of the brain shows no acute findings. Minimal atrophy and nonspecific white matter changes. Patient takes aspirin and statin at home, which were resumed upon admission.   Neurology evaluated the patient and recommended discontinuing aspirin and starting Plavix along with statin. Patient does not need any skilled therapy at this time. Patient to follow-up with cardiology and neurology as outpatient. DISCHARGE DIAGNOSES / PLAN:        BMI: There is no height or weight on file to calculate BMI. Akin Salazar PENDING TEST RESULTS:   At the time of discharge the following test results are still pending: None     ADDITIONAL CARE RECOMMENDATIONS:     Follow-up with cardiology and neurology as scheduled. NOTIFY YOUR PHYSICIAN FOR ANY OF THE FOLLOWING:   Fever over 101 degrees for 24 hours. Chest pain, shortness of breath, fever, chills, nausea, vomiting, diarrhea, change in mentation, falling, weakness, bleeding. Severe pain or pain not relieved by medications, as well as any other signs or symptoms that you may have questions about. FOLLOW UP APPOINTMENTS:    Follow-up Information     Follow up With Specialties Details Why Contact Info    Other, [place] [facility]    Patient can only remember the practice name and not the physician               DIET: Cardiac Diet    ACTIVITY: Activity as tolerated    EQUIPMENT needed: None    DISCHARGE MEDICATIONS:  Current Discharge Medication List      START taking these medications    Details   clopidogreL (PLAVIX) 75 mg tab Take 1 Tablet by mouth daily. Qty: 30 Tablet, Refills: 0  Start date: 6/12/2022         CONTINUE these medications which have NOT CHANGED    Details   apixaban (Eliquis) 5 mg tablet Take 5 mg by mouth two (2) times a day. !! sotaloL (BETAPACE) 80 mg tablet Take 80 mg by mouth Every morning. TAKE 80MG BY MOUTH EVERY MORNING AND 40MG BY MOUTH EVERY EVENING      atorvastatin (LIPITOR) 20 mg tablet Take 20 mg by mouth every evening. finasteride (PROSCAR) 5 mg tablet Take 5 mg by mouth every evening. !! sotaloL (BETAPACE) 80 mg tablet Take 40 mg by mouth every evening. TAKE 80MG BY MOUTH EVERY MORNING AND 40MG BY MOUTH EVERY EVENING       ! ! - Potential duplicate medications found. Please discuss with provider. STOP taking these medications       aspirin delayed-release 81 mg tablet Comments:   Reason for Stopping:               DISPOSITION:   * Home With:   OT  PT  HH  RN       Long term SNF/Inpatient Rehab    Independent/assisted living    Hospice    Other:       PATIENT CONDITION AT DISCHARGE:     Functional status    Poor     Deconditioned    * Independent      Cognition   *  Lucid     Forgetful     Dementia      Catheters/lines (plus indication)    [place]     PICC     PEG    * None      Code status   *  Full code     DNR      PHYSICAL EXAMINATION AT DISCHARGE:  General:          Alert, cooperative, no distress, appears stated age. HEENT:           Atraumatic, anicteric sclerae, pink conjunctivae                          No oral ulcers, mucosa moist, throat clear, dentition fair  Neck:               Supple, symmetrical  Lungs:             Clear to auscultation bilaterally. No Wheezing or Rhonchi. No rales. Chest wall:      No tenderness  No Accessory muscle use. Heart:              Regular  rhythm,  No  murmur   No edema  Abdomen:        Soft, non-tender. Not distended. Bowel sounds normal  Extremities:     No cyanosis. No clubbing,                            Skin turgor normal, Capillary refill normal  Skin:                Not pale. Not Jaundiced  No rashes   Psych:             Not anxious or agitated.   Neurologic:      Alert, moves all extremities, answers questions appropriately and responds to commands       CHRONIC MEDICAL DIAGNOSES:  Problem List as of 6/12/2022 Never Reviewed          Codes Class Noted - Resolved    TIA (transient ischemic attack) ICD-10-CM: G45.9  ICD-9-CM: 435.9  6/11/2022 - Present              Greater than 60 minutes were spent with the patient on counseling and coordination of care    Signed:   Nicolasa Donaldson MD  6/12/2022  12:31 PM

## 2022-06-12 NOTE — PROGRESS NOTES
Bedside shift change report given to HARVEY Phan (oncoming nurse) by Susanna Castellanos RN (offgoing nurse).  Report included the following information SBAR, ED Summary, Intake/Output, Recent Results, Med Rec Status and Cardiac Rhythm SINUS PHILIPP-NSR.

## 2022-06-12 NOTE — PROGRESS NOTES
Occupational Therapy Note:     Spoke with PT this morning. PT has cleared patient for therapy services. Pt exhibits no deficits at this time. Will complete OT orders at this time.   Thanks,     Carey Strickland, OT

## 2022-06-12 NOTE — PROGRESS NOTES
SLP Contact Note    Consult received and appreciated. Pt chart reviewed. MRI negative for acute infarct. Pt passed swallow screen. No history of dysphagia. No SLP needs. Will sign off. Please reconsult should SLP be of any assistance.       Thank you,  FAITH Valverde.Ed, [id number] [facility]  Speech-Language Pathologist

## 2022-06-12 NOTE — PROGRESS NOTES
PHYSICAL THERAPY EVALUATION WITH DISCHARGE  Patient: Estrella Dawkins (89 y.o. male)  Date: 6/12/2022  Primary Diagnosis: TIA (transient ischemic attack) [G45.9]       Precautions:          ASSESSMENT  Based on the objective data described below, the patient is presently modified independent with transfers and ambulation appearing to be at baseline for mobility. MRI head negative for acute findings. Pt reports resolved symptoms and does not demonstrate any significant balance deficits. Pt currently visiting family and has no concerns for negotiating stairs. Recommended pt to continue mobilizing with nursing to prevent deconditioning. .    Functional Outcome Measure: The patient scored Total: 48/56 on the Vázquez Balance Assessment which is indicative of low fall risk. Other factors to consider for discharge: fall risk     Further skilled acute physical therapy is not indicated at this time. PLAN :  Recommendation for discharge: (in order for the patient to meet his/her long term goals)  No skilled physical therapy/ follow up rehabilitation needs identified at this time. This discharge recommendation:  Has not yet been discussed the attending provider and/or case management    IF patient discharges home will need the following DME: none         SUBJECTIVE:   Patient stated My wife and I walk 3 miles/day.     OBJECTIVE DATA SUMMARY:   HISTORY:    Past Medical History:   Diagnosis Date    Atrial fibrillation ([address]    No past surgical history on file.     Prior level of function: independent, lives in shelter community with wife  Personal factors and/or comorbidities impacting plan of care: [facility]    Home Situation  Home Environment: Private residence  # Steps to Enter: 0  One/Two Story Residence: One story  Support Systems: Spouse/Significant Other  Patient Expects to be Discharged to[de-identified] Home  Current DME Used/Available at Home: None    EXAMINATION/PRESENTATION/DECISION MAKING:   Critical Behavior:  Neurologic State: Alert,Eyes open spontaneously  Orientation Level: Oriented X4  Cognition: Appropriate decision making,Appropriate for age attention/concentration,Appropriate safety awareness,Follows commands     Hearing:     Skin:  intact  Edema: none  Range Of Motion:  AROM: Within functional limits           PROM: Within functional limits           Strength:    Strength:  Within functional limits                    Tone & Sensation:   Tone: Normal              Sensation: Intact               Coordination:  Coordination: Within functional limits  Vision:      Functional Mobility:  Bed Mobility:     Supine to Sit: Independent        Transfers:  Sit to Stand: Modified independent  Stand to Sit: Modified independent                       Balance:   Sitting: Intact  Standing: Intact  Ambulation/Gait Training:  Distance (ft): 300 Feet (ft)     Ambulation - Level of Assistance: Modified independent  Base of Support: Narrowed    Functional Measure  Jeffries Balance Test:    Sitting to Standing: 3  Standing Unsupported: 4  Sitting with Back Unsupported: 4  Standing to Sitting: 3  Transfers: 3  Standing Unsupported with Eyes Closed: 3  Standing Unsupported with Feet Together: 4  Reach Forward with Outstretched Arm: 3   Object: 4  Turn to Look Over Shoulders: 3  Turn 360 Degrees: 4  Alternate Foot on Step/Stool: 4  Standing Unsupported One Foot in Front: 3  Stand on One Leg: 3  Total: 48/56         56=Maximum possible score;   0-20=High fall risk  21-40=Moderate fall risk   41-56=Low fall risk        Physical Therapy Evaluation Charge Determination   History Examination Presentation Decision-Making   MEDIUM  Complexity : 1-2 comorbidities / personal factors will impact the outcome/ POC  LOW Complexity : 1-2 Standardized tests and measures addressing body structure, function, activity limitation and / or participation in recreation  LOW Complexity : Stable, uncomplicated  Other outcome measures jeffries balance  LOW       Based on the above components, the patient evaluation is determined to be of the following complexity level: LOW       Activity Tolerance:   Good    After treatment patient left in no apparent distress:   Sitting in chair, Call bell within reach and Bed / chair alarm activated    COMMUNICATION/EDUCATION:   The patients plan of care was discussed with: Registered nurse. Patient does not present with any deficits. Patient and/or family was verbally educated on the BE FAST acronym for signs/symptoms of CVA and TIA. BE FAST was written on patient's communication board  for visual education and reinforcement. All questions answered with patient indicating good understanding. Fall prevention education was provided and the patient/caregiver indicated understanding. and Patient/family have participated as able in goal setting and plan of care.     Thank you for this referral.  Laila Dempsey, PT   Time Calculation: 17 mins

## 2022-06-12 NOTE — PROGRESS NOTES
Transition of Care    Reason for Admission:  Confusion and slurred speech                   RUR Score:   N/A [place]                  Plan for utilizing home health:    Mr. Jose Olmos is willing to have home health if it is needed. PCP: First and Last name:  Other, MD Thomas     Name of Practice: [address] [facility]   Are you a current patient: Yes/No: Yes   Approximate date of last visit: 3/12/2022   Can you participate in a virtual visit with your PCP: yes                    Current Advanced Directive/Advance Care Plan: Full Code    Mr. Jose Olmos does have advanced care documents. He was asked to bring in a copy. Healthcare Decision Maker:   Click here to complete [address] including selection of the Healthcare Decision Maker Relationship (ie \"Primary\")           Mr. Jose Olmos stated that he wife is his decisin maker, lorena Phillips. [phone]                  Transition of Care Plan: Mr. Jose Olmos was seen in his room. He lives in a senior house facility in [place], [place] in independent living. He lives with his wife in an apartment. He does not have any durable medical equipment. He was independent with his ADL's and IADL's prior to admission. He is retired. He does drive. He uses the [facility] at [phone] [facility] for his pharmacy. Mr. Jose Olmos stated that he was in town for his granddaughter's high school graduation. He was anxious to be discharged to make the graduation. There were no discharge needs identified at this time. Care Management Interventions  PCP Verified by CM:  Yes  Last Visit to PCP: 03/12/22  Palliative Care Criteria Met (RRAT>21 & CHF Dx)?: No  Mode of Transport at Discharge: Self  Transition of Care Consult (CM Consult): Discharge Planning  MyChart Signup: No  Discharge Durable Medical Equipment: No  Physical Therapy Consult: Yes  Occupational Therapy Consult: Yes  Speech Therapy Consult: Yes  Support Systems: Spouse/Significant Other,Child(alisa),Other Family Member(s)  Confirm Follow Up Transport: Self  The Patient and/or Patient Representative was Provided with a Choice of Provider and Agrees with the Discharge Plan?: No  Freedom of Choice List was Provided with Basic Dialogue that Supports the Patient's Individualized Plan of Care/Goals, Treatment Preferences and Shares the Quality Data Associated with the Providers?: No  [place] Resource Information Provided?: No  Discharge Location  Patient Expects to be Discharged to[de-identified] Home      Medicare Outpatient Observation Notice (MOON)/ [place] Outpatient Observation Notice (Raghu Yancey) provided to patient/representative with verbal explanation of the notice. Time allotted for questions regarding the notice. Patient /representative provided a completed copy of the MOON/VOON notice. Copy placed on bedside chart. Will continue to follow for discharge planning.   Signed By: Xi Cruz LCSW     June 12, 2022

## 2022-06-12 NOTE — DISCHARGE INSTRUCTIONS
Patient Education        Transient Ischemic Attack: Care Instructions  Overview     A transient ischemic attack (TIA) is when blood flow to a part of your brain is blocked for a short time. A TIA causes stroke symptoms that can last for at least a few minutes. Stroke symptoms include sudden weakness or loss of movement in a part of your body, confusion, vision changes, trouble speaking, and trouble walking or balancing. But unlike a stroke, a TIA doesn't cause lasting brain damage. TIAs are often warning signs of a stroke. Some people who have a TIA may have a stroke in the future. If you have symptoms of a stroke, call for emergency help right away. Quick treatment can help limit damage to the brain and increase the chance of recovery. You can take steps to help prevent a stroke. These steps include managing health problems that raise your risk, taking medicine that prevents blood clots, and having a heart-healthy lifestyle. This lifestyle includes being active, eating healthy foods, staying at a healthy weight, and not smoking. Follow-up care is a key part of your treatment and safety. Be sure to make and go to all appointments, and call your doctor if you are having problems. It's also a good idea to know your test results and keep a list of the medicines you take. How can you care for yourself at home? Medicines    · Be safe with medicines. Take your medicines exactly as prescribed. Call your doctor if you think you are having a problem with your medicine.     · If you take a blood thinner, such as aspirin, be sure you get instructions about how to take your medicine safely. Blood thinners can cause serious bleeding problems.     · Call your doctor if you are not able to take your medicines for any reason.     · Do not take any over-the-counter medicines or herbal products without talking to your doctor first.     · If you use hormonal birth control or hormone therapy, talk to your doctor.  Ask if these are right for you. They may raise the risk of stroke in some people. Heart-healthy lifestyle    · Do not smoke. If you need help quitting, talk to your doctor about stop-smoking programs and medicines.     · Be active. If your doctor recommends it, get more exercise. Walking is a good choice. Bit by bit, increase the amount you walk every day. Try for at least 30 minutes on most days of the week. You also may want to swim, bike, or do other activities.     · Eat heart-healthy foods. These include vegetables, fruits, nuts, beans, lean meat, fish, and whole grains. Limit sodium and sugar.     · Stay at a healthy weight. Lose weight if you need to.     · Limit alcohol to 2 drinks a day for men and 1 drink a day for women. Staying healthy    · Manage other health problems that raise your risk of stroke. These include atrial fibrillation, diabetes, high blood pressure, and high cholesterol.     · If you think you may have a problem with alcohol or drug use, talk to your doctor.     · Get the flu vaccine every year. When should you call for help? Call 911 anytime you think you may need emergency care. For example, call if:    · You have symptoms of a stroke. These may include:  ? Sudden numbness, tingling, weakness, or loss of movement in your face, arm, or leg, especially on only one side of your body. ? Sudden vision changes. ? Sudden trouble speaking. ? Sudden confusion or trouble understanding simple statements. ? Sudden problems with walking or balance. ? A sudden, severe headache that is different from past headaches. Call 911 even if these symptoms go away in a few minutes.     · You feel like you are having another TIA. Watch closely for changes in your health, and be sure to contact your doctor if you have any problems. Where can you learn more?   Go to http://miki-veronika.info/  Enter I231 in the search box to learn more about \"Transient Ischemic Attack: Care Instructions. \"  Current as of: July 6, 2021               Content Version: 13.2  © 4912-1021 Healthwise, [facility]. Care instructions adapted under license by [employer] (which disclaims liability or warranty for this information). If you have questions about a medical condition or this instruction, always ask your healthcare professional. [place] [address] any warranty or liability for your use of this information.

## 2023-05-09 RX ORDER — SOTALOL HYDROCHLORIDE 80 MG/1
40 TABLET ORAL EVERY EVENING
COMMUNITY